# Patient Record
Sex: MALE | Race: WHITE | NOT HISPANIC OR LATINO | Employment: FULL TIME | URBAN - METROPOLITAN AREA
[De-identification: names, ages, dates, MRNs, and addresses within clinical notes are randomized per-mention and may not be internally consistent; named-entity substitution may affect disease eponyms.]

---

## 2019-02-04 ENCOUNTER — TELEPHONE (OUTPATIENT)
Dept: GASTROENTEROLOGY | Facility: MEDICAL CENTER | Age: 49
End: 2019-02-04

## 2019-02-04 NOTE — TELEPHONE ENCOUNTER
Called ptvivienne to call office   - Patient has an upcoming appt with us, however we do not have any prior records for this patient  Please try to obtain any prior GI recs before appt  Thank you

## 2019-02-05 ENCOUNTER — LAB (OUTPATIENT)
Dept: LAB | Facility: MEDICAL CENTER | Age: 49
End: 2019-02-05
Attending: INTERNAL MEDICINE
Payer: COMMERCIAL

## 2019-02-05 ENCOUNTER — OFFICE VISIT (OUTPATIENT)
Dept: GASTROENTEROLOGY | Facility: MEDICAL CENTER | Age: 49
End: 2019-02-05
Payer: COMMERCIAL

## 2019-02-05 VITALS
DIASTOLIC BLOOD PRESSURE: 68 MMHG | SYSTOLIC BLOOD PRESSURE: 122 MMHG | HEART RATE: 77 BPM | WEIGHT: 184 LBS | TEMPERATURE: 98.7 F

## 2019-02-05 DIAGNOSIS — K50.00 CROHN'S DISEASE OF SMALL INTESTINE WITHOUT COMPLICATION (HCC): Primary | ICD-10-CM

## 2019-02-05 DIAGNOSIS — R13.19 OTHER DYSPHAGIA: ICD-10-CM

## 2019-02-05 DIAGNOSIS — K50.00 CROHN'S DISEASE OF SMALL INTESTINE WITHOUT COMPLICATION (HCC): ICD-10-CM

## 2019-02-05 LAB
25(OH)D3 SERPL-MCNC: 66.4 NG/ML (ref 30–100)
ALBUMIN SERPL BCP-MCNC: 3.5 G/DL (ref 3.5–5)
ALP SERPL-CCNC: 51 U/L (ref 46–116)
ALT SERPL W P-5'-P-CCNC: 16 U/L (ref 12–78)
ANION GAP SERPL CALCULATED.3IONS-SCNC: 8 MMOL/L (ref 4–13)
AST SERPL W P-5'-P-CCNC: 7 U/L (ref 5–45)
BASOPHILS # BLD AUTO: 0.01 THOUSANDS/ΜL (ref 0–0.1)
BASOPHILS NFR BLD AUTO: 0 % (ref 0–1)
BILIRUB SERPL-MCNC: 0.44 MG/DL (ref 0.2–1)
BUN SERPL-MCNC: 15 MG/DL (ref 5–25)
CALCIUM SERPL-MCNC: 8.8 MG/DL (ref 8.3–10.1)
CHLORIDE SERPL-SCNC: 101 MMOL/L (ref 100–108)
CO2 SERPL-SCNC: 30 MMOL/L (ref 21–32)
CREAT SERPL-MCNC: 0.8 MG/DL (ref 0.6–1.3)
CRP SERPL QL: 9.7 MG/L
EOSINOPHIL # BLD AUTO: 0 THOUSAND/ΜL (ref 0–0.61)
EOSINOPHIL NFR BLD AUTO: 0 % (ref 0–6)
ERYTHROCYTE [DISTWIDTH] IN BLOOD BY AUTOMATED COUNT: 12.9 % (ref 11.6–15.1)
EST. AVERAGE GLUCOSE BLD GHB EST-MCNC: 126 MG/DL
FERRITIN SERPL-MCNC: 312 NG/ML (ref 8–388)
GFR SERPL CREATININE-BSD FRML MDRD: 106 ML/MIN/1.73SQ M
GLUCOSE SERPL-MCNC: 192 MG/DL (ref 65–140)
HBA1C MFR BLD: 6 % (ref 4.2–6.3)
HCT VFR BLD AUTO: 37.4 % (ref 36.5–49.3)
HGB BLD-MCNC: 12.9 G/DL (ref 12–17)
IMM GRANULOCYTES # BLD AUTO: 0.11 THOUSAND/UL (ref 0–0.2)
IMM GRANULOCYTES NFR BLD AUTO: 1 % (ref 0–2)
IRON SATN MFR SERPL: 28 %
IRON SERPL-MCNC: 82 UG/DL (ref 65–175)
LYMPHOCYTES # BLD AUTO: 0.75 THOUSANDS/ΜL (ref 0.6–4.47)
LYMPHOCYTES NFR BLD AUTO: 5 % (ref 14–44)
MCH RBC QN AUTO: 32.2 PG (ref 26.8–34.3)
MCHC RBC AUTO-ENTMCNC: 34.5 G/DL (ref 31.4–37.4)
MCV RBC AUTO: 93 FL (ref 82–98)
MONOCYTES # BLD AUTO: 0.54 THOUSAND/ΜL (ref 0.17–1.22)
MONOCYTES NFR BLD AUTO: 4 % (ref 4–12)
NEUTROPHILS # BLD AUTO: 12.53 THOUSANDS/ΜL (ref 1.85–7.62)
NEUTS SEG NFR BLD AUTO: 90 % (ref 43–75)
NRBC BLD AUTO-RTO: 0 /100 WBCS
PLATELET # BLD AUTO: 614 THOUSANDS/UL (ref 149–390)
PMV BLD AUTO: 8.6 FL (ref 8.9–12.7)
POTASSIUM SERPL-SCNC: 2.9 MMOL/L (ref 3.5–5.3)
PROT SERPL-MCNC: 6.5 G/DL (ref 6.4–8.2)
RBC # BLD AUTO: 4.01 MILLION/UL (ref 3.88–5.62)
SODIUM SERPL-SCNC: 139 MMOL/L (ref 136–145)
TIBC SERPL-MCNC: 294 UG/DL (ref 250–450)
VIT B12 SERPL-MCNC: 317 PG/ML (ref 100–900)
WBC # BLD AUTO: 13.94 THOUSAND/UL (ref 4.31–10.16)

## 2019-02-05 PROCEDURE — 99244 OFF/OP CNSLTJ NEW/EST MOD 40: CPT | Performed by: INTERNAL MEDICINE

## 2019-02-05 PROCEDURE — 83540 ASSAY OF IRON: CPT

## 2019-02-05 PROCEDURE — 83036 HEMOGLOBIN GLYCOSYLATED A1C: CPT

## 2019-02-05 PROCEDURE — 86140 C-REACTIVE PROTEIN: CPT

## 2019-02-05 PROCEDURE — 36415 COLL VENOUS BLD VENIPUNCTURE: CPT

## 2019-02-05 PROCEDURE — 82306 VITAMIN D 25 HYDROXY: CPT

## 2019-02-05 PROCEDURE — 83550 IRON BINDING TEST: CPT

## 2019-02-05 PROCEDURE — 82607 VITAMIN B-12: CPT

## 2019-02-05 PROCEDURE — 80053 COMPREHEN METABOLIC PANEL: CPT

## 2019-02-05 PROCEDURE — 82728 ASSAY OF FERRITIN: CPT

## 2019-02-05 PROCEDURE — 85025 COMPLETE CBC W/AUTO DIFF WBC: CPT

## 2019-02-05 RX ORDER — FOLIC ACID 1 MG/1
5000 TABLET ORAL DAILY
Refills: 5 | COMMUNITY
Start: 2018-12-28

## 2019-02-05 RX ORDER — BUSPIRONE HYDROCHLORIDE 15 MG/1
15 TABLET ORAL 3 TIMES DAILY
COMMUNITY

## 2019-02-05 RX ORDER — OMEPRAZOLE 20 MG/1
20 CAPSULE, DELAYED RELEASE ORAL DAILY
COMMUNITY
End: 2020-09-18 | Stop reason: SDUPTHER

## 2019-02-05 RX ORDER — CLONAZEPAM 1 MG/1
0.5 TABLET ORAL 2 TIMES DAILY PRN
COMMUNITY

## 2019-02-05 RX ORDER — PREDNISONE 20 MG/1
40 TABLET ORAL DAILY
Status: ON HOLD | COMMUNITY
End: 2019-03-05 | Stop reason: ALTCHOICE

## 2019-02-05 NOTE — PATIENT INSTRUCTIONS
Colononscopy and EGD scheduled on 3/5/2019 at the [facility]   Suprep sent to pharmacy/ instructions given to patient

## 2019-02-05 NOTE — PROGRESS NOTES
[facility] - Outpatient Consultation  Yan Rater 50 y o  male MRN: [id number]  Encounter: [id number]        ASSESSMENT AND PLAN:    Edwige Corley was seen today for Crohn's disease, abdominal pain, joint pain, mouth lesions and stelara  Diagnoses and all orders for this visit:    Crohn's disease of small intestine without complication  I will schedule him for a colonoscopy  Risks and benefits of the procedure were    discussed  Risks include, but are not limited to bleeding, perforation, missed lesions  He is agreeable to the procedure  Bowel prep instructions given  -     Case request operating room: COLONOSCOPY, ESOPHAGOGASTRODUODENOSCOPY (EGD); Standing  -     CBC and differential; Future  -     Comprehensive metabolic panel; Future  -     C-reactive protein; Future  -     Iron Panel; Future  -     Vitamin B12; Future  -     Vitamin D 25 hydroxy; Future  -     Na Sulfate-K Sulfate-Mg Sulf (SUPREP BOWEL PREP KIT) 17 5-3 13-1 6 GM/177ML SOLN; Take 1 Bottle by mouth once for 1 dose  -     Case request operating room: COLONOSCOPY, ESOPHAGOGASTRODUODENOSCOPY (EGD)  -continue stelara and pred 40 for now with goals of weaning off prednisone; will need dexa scan in the future due to long term steroid use    Other dysphagia  I will schedule him for an EGD  Risks and benefits of the procedure were    discussed  Risks include, but are not limited to bleeding, perforation, missed lesions  He is agreeable to the procedure  -     Case request operating room: COLONOSCOPY, ESOPHAGOGASTRODUODENOSCOPY (EGD); Standing  -     Case request operating room: COLONOSCOPY, ESOPHAGOGASTRODUODENOSCOPY (EGD)    Follow up in 1 month with me once records are in the system  _____________________________________________________________________    HPI:  Yan Rater is a 50 y o  male here for the evaluation and treatment of Crohn's disease and RLQ abdominal pain   He is currently taking Omeprazole 20 mg delayed release capsule for GERD  Arnaldo Phillips He states that the Crohn's disease is in the small intestine  It was diagnosed due to abdominal pain and undigested food and pills  He was in the ED in October 2018 due to an inflamed transverse colon  He is still experiencing blood and mucus in the stool  Patient was on Humira for a year, but it was stopped because it was not working  He was then switched to [facility], which helped more than the Humira, but could not afford it for longer than the year and a half that he was on it  He is currently taking Stelara 90mg subQ q 8 weeks  He is here on his own accord to seek a second opinion  He is also currently taking Prednisone 40mg which alleviates the pain, but he is now having trouble swallowing solids and liquids and wants to wean himself off of the prednisone  His past surgical history includes an EGD and colonoscopy 4 years ago, and was deemed asymptomatic  REVIEW OF SYSTEMS:    CONSTITUTIONAL: Denies any fever, chills, rigors, and weight loss  HEENT: No earache or tinnitus  Denies hearing loss or visual disturbances  CARDIOVASCULAR: No chest pain or palpitations  RESPIRATORY: Denies any cough, hemoptysis, shortness of breath or dyspnea on exertion  GASTROINTESTINAL: As noted in the History of Present Illness  GENITOURINARY: No problems with urination  Denies any hematuria or dysuria  NEUROLOGIC: No dizziness or vertigo, denies headaches  MUSCULOSKELETAL: Denies any muscle or joint pain  SKIN: Denies skin rashes or itching  ENDOCRINE: Denies excessive thirst  Denies intolerance to heat or cold  PSYCHOSOCIAL: Denies depression or anxiety  Denies any recent memory loss         Historical Information   Past Medical History:   Diagnosis Date    Anxiety     Cro[facility]'s disease ([facility] [address] )     Gallbladder polyp      Past Surgical History:   Procedure Laterality Date    COLONOSCOPY      LUMBAR DISCECTOMY      UMBILICAL HERNIA REPAIR      UPPER GASTROINTESTINAL ENDOSCOPY Social History   History   Alcohol Use    Yes     Comment: socially     History   Drug Use No     History   Smoking Status    Never Smoker   Smokeless Tobacco    Current User     History reviewed  No pertinent family history  Meds/Allergies       Current Outpatient Prescriptions:     busPIRone (BUSPAR) 15 mg tablet    clonazePAM (KlonoPIN) 1 mg tablet    folic acid (FOLVITE) 1 mg tablet    methotrexate (PF) intrathecal injection    omeprazole (PriLOSEC) 20 mg delayed release capsule    predniSONE 20 mg tablet    ustekinumab (STELARA) 90 mg/mL subcutaneous injection    No Known Allergies    Objective     Blood pressure 122/68, pulse 77, temperature 98 7 °F (37 1 °C), temperature source Tympanic, weight 83 5 kg (184 lb)  There is no height or weight on file to calculate BMI  PHYSICAL EXAM:      General Appearance:   Alert, cooperative, no distress   HEENT:   Normocephalic, atraumatic, anicteric      Neck:  Supple, symmetrical, trachea midline   Lungs:   Clear to auscultation bilaterally; no rales, rhonchi or wheezing; respirations unlabored    Heart[de-identified]   Regular rate and rhythm; no murmur, rub, or gallop  Abdomen:   Soft, non-tender, non-distended; normal bowel sounds; no masses, no organomegaly    Genitalia:   Deferred    Rectal:   Deferred    Extremities:  No cyanosis, clubbing or edema    Pulses:  2+ and symmetric    Skin:  No jaundice, rashes, or lesions    Lymph nodes:  No palpable cervical lymphadenopathy        Lab Results:   No visits with results within 1 Day(s) from this visit  Latest known visit with results is:   No results found for any previous visit  Radiology Results:   No results found  Attestation:   By signing my name below, IGerald, attest that this documentation has been    prepared under the direction and in the presence of SUSANNA Terry  Electronically    Signed: Rebeca Mo   2/5/19        Alison MARIN, personally performed the services described in this documentation  All medical record entries made by the scribe were at my direction and in my presence  I    have reviewed the chart and discharge instructions and agree that the record reflects my    personal performance and is accurate and complete   SUSANNA Aceves  2/5/19

## 2019-02-19 DIAGNOSIS — K50.118 CROHN'S DISEASE OF COLON WITH OTHER COMPLICATION (HCC): Primary | ICD-10-CM

## 2019-02-21 NOTE — RESULT ENCOUNTER NOTE
I tried calling Haresh x 2, but no answer and voicemail box is full  Will continue to try to call him regarding low K+ finding

## 2019-02-22 ENCOUNTER — TELEPHONE (OUTPATIENT)
Dept: GASTROENTEROLOGY | Facility: MEDICAL CENTER | Age: 49
End: 2019-02-22

## 2019-02-22 NOTE — TELEPHONE ENCOUNTER
Received a call back from patient  Reviewed critical lab value of low potassium of 2 9  Also discussed elevated CRP and mild leukocytosis  Patient reports that he has been having several weeks of diarrhea, with bowel movements 7-10 times a day  Patient states that when he saw [facility] in early February, he had had a poor appetite at that time, and feels as if his appetite has improved slightly since then  Patient reports he occasionally has sensation of heart racing  Reviewed case with Dr Ramone Rondon, Attending in office  Given low potassium finding, report of heart racing, and persistent diarrhea, concern that pt is at increased risk of dehydration and persistent electrolyte abnormalities  I advised patient to proceed immediately to the nearest ER for evaluation and repeat BMP, and patient stated that he would go to [facility] ER in [address] [place] [address]  Patient requested that we share his records with care providers at [facility] for continuity of his care  Given the urgent nature of this matter, patient's most recent lab data and GI consultation was faxed to [facility][1] ED (Fax: [phone])

## 2019-02-22 NOTE — RESULT ENCOUNTER NOTE
Attempted to reach pt by phone again today; straight to voicemail and mailbox is full  Will attempt to reach pt again today

## 2019-02-25 ENCOUNTER — TELEPHONE (OUTPATIENT)
Dept: GASTROENTEROLOGY | Facility: CLINIC | Age: 49
End: 2019-02-25

## 2019-02-25 NOTE — TELEPHONE ENCOUNTER
----- Message from Madina Carter PA-C sent at 2019  4:48 PM EST -----  Regarding: records request  Would you be able to fax a request for medical records to [facility] for [address] labs from ED visit 19? Their fax is [phone]  They need a letter on [employer] letterhead with our fax number, patient's name and , and indicating that we request lab data from 19  If this could be obtained and uploaded to our system, that would be great (since they aren't on Epic)  Thank you!     [address] [facility]

## 2019-03-04 ENCOUNTER — ANESTHESIA EVENT (OUTPATIENT)
Dept: PERIOP | Facility: HOSPITAL | Age: 49
End: 2019-03-04
Payer: COMMERCIAL

## 2019-03-05 ENCOUNTER — ANESTHESIA (OUTPATIENT)
Dept: PERIOP | Facility: HOSPITAL | Age: 49
End: 2019-03-05
Payer: COMMERCIAL

## 2019-03-05 ENCOUNTER — HOSPITAL ENCOUNTER (OUTPATIENT)
Facility: HOSPITAL | Age: 49
Setting detail: OUTPATIENT SURGERY
Discharge: HOME | End: 2019-03-05
Attending: INTERNAL MEDICINE | Admitting: INTERNAL MEDICINE
Payer: COMMERCIAL

## 2019-03-05 VITALS
HEART RATE: 86 BPM | SYSTOLIC BLOOD PRESSURE: 120 MMHG | WEIGHT: 184 LBS | RESPIRATION RATE: 18 BRPM | OXYGEN SATURATION: 92 % | TEMPERATURE: 99.2 F | HEIGHT: 74 IN | BODY MASS INDEX: 23.61 KG/M2 | DIASTOLIC BLOOD PRESSURE: 72 MMHG

## 2019-03-05 DIAGNOSIS — R13.19 OTHER DYSPHAGIA: ICD-10-CM

## 2019-03-05 DIAGNOSIS — K50.00 CROHN'S DISEASE OF SMALL INTESTINE WITHOUT COMPLICATION (HCC): ICD-10-CM

## 2019-03-05 PROCEDURE — 88305 TISSUE EXAM BY PATHOLOGIST: CPT | Performed by: PATHOLOGY

## 2019-03-05 PROCEDURE — 45380 COLONOSCOPY AND BIOPSY: CPT | Performed by: INTERNAL MEDICINE

## 2019-03-05 PROCEDURE — 43235 EGD DIAGNOSTIC BRUSH WASH: CPT | Performed by: INTERNAL MEDICINE

## 2019-03-05 RX ORDER — SODIUM CHLORIDE, SODIUM LACTATE, POTASSIUM CHLORIDE, CALCIUM CHLORIDE 600; 310; 30; 20 MG/100ML; MG/100ML; MG/100ML; MG/100ML
125 INJECTION, SOLUTION INTRAVENOUS CONTINUOUS
Status: DISCONTINUED | OUTPATIENT
Start: 2019-03-05 | End: 2019-03-05 | Stop reason: HOSPADM

## 2019-03-05 RX ORDER — FENTANYL CITRATE 50 UG/ML
INJECTION, SOLUTION INTRAMUSCULAR; INTRAVENOUS AS NEEDED
Status: DISCONTINUED | OUTPATIENT
Start: 2019-03-05 | End: 2019-03-05 | Stop reason: SURG

## 2019-03-05 RX ORDER — PROPOFOL 10 MG/ML
INJECTION, EMULSION INTRAVENOUS AS NEEDED
Status: DISCONTINUED | OUTPATIENT
Start: 2019-03-05 | End: 2019-03-05 | Stop reason: SURG

## 2019-03-05 RX ORDER — PROPOFOL 10 MG/ML
INJECTION, EMULSION INTRAVENOUS CONTINUOUS PRN
Status: DISCONTINUED | OUTPATIENT
Start: 2019-03-05 | End: 2019-03-05 | Stop reason: SURG

## 2019-03-05 RX ADMIN — FENTANYL CITRATE 50 MCG: 50 INJECTION, SOLUTION INTRAMUSCULAR; INTRAVENOUS at 11:30

## 2019-03-05 RX ADMIN — FENTANYL CITRATE 25 MCG: 50 INJECTION, SOLUTION INTRAMUSCULAR; INTRAVENOUS at 11:34

## 2019-03-05 RX ADMIN — PHENYLEPHRINE HYDROCHLORIDE 100 MCG: 10 INJECTION INTRAVENOUS at 11:48

## 2019-03-05 RX ADMIN — PHENYLEPHRINE HYDROCHLORIDE 100 MCG: 10 INJECTION INTRAVENOUS at 11:59

## 2019-03-05 RX ADMIN — SODIUM CHLORIDE, SODIUM LACTATE, POTASSIUM CHLORIDE, AND CALCIUM CHLORIDE 125 ML/HR: .6; .31; .03; .02 INJECTION, SOLUTION INTRAVENOUS at 10:48

## 2019-03-05 RX ADMIN — PHENYLEPHRINE HYDROCHLORIDE 100 MCG: 10 INJECTION INTRAVENOUS at 12:02

## 2019-03-05 RX ADMIN — PROPOFOL 180 MCG/KG/MIN: 10 INJECTION, EMULSION INTRAVENOUS at 11:30

## 2019-03-05 RX ADMIN — LIDOCAINE HYDROCHLORIDE 100 ML: 20 INJECTION, SOLUTION INTRAVENOUS at 11:30

## 2019-03-05 RX ADMIN — PROPOFOL 100 MG: 10 INJECTION, EMULSION INTRAVENOUS at 11:30

## 2019-03-05 RX ADMIN — PHENYLEPHRINE HYDROCHLORIDE 100 MCG: 10 INJECTION INTRAVENOUS at 11:54

## 2019-03-05 RX ADMIN — FENTANYL CITRATE 25 MCG: 50 INJECTION, SOLUTION INTRAMUSCULAR; INTRAVENOUS at 11:38

## 2019-03-05 NOTE — H&P
History and Physical -  [facility]  Teodoro Cronin 50 y o  male MRN: [id number]                  HPI: Teodoro Cronin is a 50y o  year old male who presents for EGD and colonoscopy  History of Crohn's disease  Just establishing care with me at most recent visit  REVIEW OF SYSTEMS: Per the HPI, and otherwise unremarkable  Historical Information   Past Medical History:   Diagnosis Date    Anxiety     Crohn's disease ([address] )     Gallbladder polyp      Past Surgical History:   Procedure Laterality Date    COLONOSCOPY      LUMBAR DISCECTOMY      UMBILICAL HERNIA REPAIR      UPPER GASTROINTESTINAL ENDOSCOPY       Social History   Social History     Substance and Sexual Activity   Alcohol Use Yes    Comment: daily 3-4 beers      Social History     Substance and Sexual Activity   Drug Use No     Social History     Tobacco Use   Smoking Status Never Smoker   Smokeless Tobacco Current User     History reviewed  No pertinent family history  Meds/Allergies     Medications Prior to Admission   Medication    busPIRone (BUSPAR) 15 mg tablet    clonazePAM (KlonoPIN) 1 mg tablet    folic acid (FOLVITE) 1 mg tablet    omeprazole (PriLOSEC) 20 mg delayed release capsule    ustekinumab (STELARA) 90 mg/mL subcutaneous injection    methotrexate (PF) intrathecal injection    Na Sulfate-K Sulfate-Mg Sulf (SUPREP BOWEL PREP KIT) 17 5-3 13-1 6 GM/177ML SOLN       No Known Allergies    Objective     Blood pressure 120/71, pulse 103, temperature 98 9 °F (37 2 °C), temperature source Tympanic, resp  rate 18, height 6' 2" (1 88 m), weight 83 5 kg (184 lb), SpO2 97 %  PHYSICAL EXAM    Gen: NAD  CV: RRR  CHEST: Clear  ABD: soft, NT/ND  EXT: no edema      ASSESSMENT/PLAN:  This is a 50y o  year old male here for EGD and colonoscopy, and he is stable and optimized for his procedure

## 2019-03-05 NOTE — OP NOTE
OPERATIVE REPORT  PATIENT NAME: Ger Gibbs    :  1970  MRN: [id number]  Pt Location: [facility] OR ROOM 03    SURGERY DATE: 3/5/2019    Surgeon(s) and Role:     * Law Dumont DO - Primary    Preop Diagnosis:  Crohn's disease of small intestine without complication ([place] [address] ) [V39 43]  Other dysphagia [R13 19]    Post-Op Diagnosis Codes:     * Crohn's disease of small intestine without complication ([place] [address] ) [S71 14]     * Other dysphagia [R13 19]    Procedure(s) (LRB):  COLONOSCOPY (N/A)  ESOPHAGOGASTRODUODENOSCOPY (EGD) (N/A)    Specimen(s):  ID Type Source Tests Collected by Time Destination   1 : bx duodenum impression:  normal Tissue Duodenum TISSUE EXAM Law Dumont, DO 3/5/2019 1138    2 : gastric bx, impression:  normal Tissue Stomach TISSUE EXAM Stephanie Griffin, DO 3/5/2019 1140    3 : bx  of ascending colon, impression: normal Tissue Large Intestine, Right/Ascending Colon TISSUE EXAM Stephanie Griffin, DO 3/5/2019 1152    4 : bx transverse colon impression: normal Tissue Large Intestine, Transverse Colon TISSUE EXAM Law Dumont, DO 3/5/2019 1154        Estimated Blood Loss:   Minimal    Drains:  * No LDAs found *    Anesthesia Type:   Choice    Operative Indications:  Crohn's disease of small intestine without complication ([place] [address] ) [A97 94]  Other dysphagia [R13 19]      Operative Findings:    ESOPHAGOGASTRODUODENOSCOPY    PROCEDURE: EGD    SEDATION: Monitored anesthesia care, check anesthesia records    ASA Class: 2    INDICATIONS: known Crohn's disease    CONSENT:  Informed consent was obtained for the procedure, including sedation after explaining the risks and benefits of the procedure  Risks including but not limited to bleeding, perforation, infection, and missed lesion  PREPARATION:   Telemetry, pulse oximetry, blood pressure were monitored throughout the procedure  Patient was identified by myself both verbally and by visual inspection of ID band      DESCRIPTION:   Patient was placed in the left lateral decubitus position and was sedated with the above medication  The gastroscope was introduced in to the oropharynx and the esophagus was intubated under direct visualization  Scope was passed down the esophagus up to 2nd part of the duodenum  A careful inspection was made as the gastroscope was withdrawn, including a retroflexed view of the stomach; findings and interventions are described below  FINDINGS:      #1  Esophagus- normal esophagus     #2  Stomach- normal stomach on direct and retroflexed views     #3  Duodenum- normal first and second part of the duodenum           IMPRESSIONS:      Normal EGD  Biopsied from stomach and duodenum  RECOMMENDATIONS:     Await biopsy results  COMPLICATIONS:  None; patient tolerated the procedure well  Colonoscopy Procedure Note    Procedure: Colonoscopy    Sedation: Monitored anesthesia care, check anesthesia records      ASA Class: 2    INDICATIONS: history of Crohn's disease     POST-OP DIAGNOSIS: See the impression below    Procedure Details     Prior colonoscopy: Less than 3 years ago  It is being repeated at an interval of less than 3 years because: This colonoscopy is being performed for a diagnostic indication    Informed consent was obtained for the procedure, including sedation  Risks of perforation, hemorrhage, adverse drug reaction and aspiration were discussed  The patient was placed in the left lateral decubitus position  Based on the pre-procedure assessment, including review of the patient's medical history, medications, allergies, and review of systems, he had been deemed to be an appropriate candidate for conscious sedation; he was therefore sedated with the medications listed below  The patient was monitored continuously with telemetry, pulse oximetry, blood pressure monitoring, and direct observations  A rectal examination was performed    The colonoscope was inserted into the rectum and advanced under direct vision to the cecum, which was identified by the ileocecal valve and appendiceal orifice  The quality of the colonic preparation was good  A careful inspection was made as the colonoscope was withdrawn, including a retroflexed view of the rectum; findings and interventions are described below  Findings:  Normal appearance of the TI  Normal appearance of the rest of the colon  Biopsies obtained from the ascending and transverse colon  Complications: None; patient tolerated the procedure well  Impression:    Normal appearance of the TI  Normal appearance of the rest of the colon  Biopsies obtained from the ascending and transverse colon  Recommendations: Follow up on biopsy results  Continue stelara  No active inflammation identified on the examinations today  COMPLICATIONS:  None; patient tolerated the procedure well      SPECIMENS:    ID Type Source Tests Collected by Time Destination   1 : bx duodenum impression:  normal Tissue Duodenum TISSUE EXAM [facility],  3/5/2019 1138    2 : gastric bx, impression:  normal Tissue Stomach TISSUE EXAM [facility], DO 3/5/2019 1140    3 : bx  of ascending colon, impression: normal Tissue Large Intestine, Right/Ascending Colon TISSUE EXAM [facility], DO 3/5/2019 1152    4 : bx transverse colon impression: normal Tissue Large Intestine, Transverse Colon TISSUE EXAM [facility], DO 3/5/2019 1154        ESTIMATED BLOOD LOSS:  Minimal      SPECIMENS:    ID Type Source Tests Collected by Time Destination   1 : bx duodenum impression:  normal Tissue Duodenum TISSUE EXAM [facility], DO 3/5/2019 1138    2 : gastric bx, impression:  normal Tissue Stomach TISSUE EXAM [facility], DO 3/5/2019 1140    3 : bx  of ascending colon, impression: normal Tissue Large Intestine, Right/Ascending Colon TISSUE EXAM [facility],  3/5/2019 1152    4 : bx transverse colon impression: normal Tissue Large Intestine, Transverse Colon TISSUE EXAM Kingsley Diamond DO 3/5/2019 1154        ESTIMATED BLOOD LOSS:  Minimal      SIGNATURE: Kingsley Diamond DO  DATE: March 5, 2019  TIME: 12:43 PM

## 2019-03-05 NOTE — ANESTHESIA PREPROCEDURE EVALUATION
Review of Systems/Medical History  Patient summary reviewed        Cardiovascular  Negative cardio ROS    Pulmonary  Negative pulmonary ROS        GI/Hepatic    Bowel prep  Comment: Chron's disease     Negative  ROS        Endo/Other  Negative endo/other ROS      GYN       Hematology  Negative hematology ROS      Musculoskeletal  Negative musculoskeletal ROS        Neurology  Negative neurology ROS      Psychology   Anxiety,              Physical Exam    Airway    Mallampati score: II  TM Distance: >3 FB  Neck ROM: full     Dental   No notable dental hx     Cardiovascular  Comment: Negative ROS,     Pulmonary      Other Findings        Anesthesia Plan  ASA Score- 2     Anesthesia Type- IV sedation with anesthesia with ASA Monitors  Additional Monitors:   Airway Plan:         Plan Factors-  Patient did not smoke on day of surgery  Induction-     Postoperative Plan-     Informed Consent- Anesthetic plan and risks discussed with patient  I personally reviewed this patient with the CRNA  Discussed and agreed on the Anesthesia Plan with the CRNA  Amparo Vogt

## 2019-03-05 NOTE — DISCHARGE INSTR - AVS FIRST PAGE
OPERATIVE REPORT  PATIENT NAME: Daniele Nichole    :  1970  MRN: [id number]  Pt Location: [facility] OR ROOM 03    SURGERY DATE: 3/5/2019    Surgeon(s) and Role:     * [facility],  - Primary    Preop Diagnosis:  Crohn's disease of small intestine without complication ([place] [address] ) [W94 90]  Other dysphagia [R13 19]    Post-Op Diagnosis Codes:     * Crohn's disease of small intestine without complication ([place] [address] ) [U49 72]     * Other dysphagia [R13 19]    Procedure(s) (LRB):  COLONOSCOPY (N/A)  ESOPHAGOGASTRODUODENOSCOPY (EGD) (N/A)    Specimen(s):  ID Type Source Tests Collected by Time Destination   1 : bx duodenum impression:  normal Tissue Duodenum TISSUE EXAM [facility], DO 3/5/2019 1138    2 : gastric bx, impression:  normal Tissue Stomach TISSUE EXAM [facility],  3/5/2019 1140    3 : bx  of ascending colon, impression: normal Tissue Large Intestine, Right/Ascending Colon TISSUE EXAM [facility], DO 3/5/2019 1152    4 : bx transverse colon impression: normal Tissue Large Intestine, Transverse Colon TISSUE EXAM [facility], DO 3/5/2019 1154        Estimated Blood Loss:   Minimal    Drains:  * No LDAs found *    Anesthesia Type:   Choice    Operative Indications:  Crohn's disease of small intestine without complication ([place] [address] ) [F07 20]  Other dysphagia [R13 19]      Operative Findings:    ESOPHAGOGASTRODUODENOSCOPY    PROCEDURE: EGD    SEDATION: Monitored anesthesia care, check anesthesia records    ASA Class: 2    INDICATIONS: known Crohn's disease    CONSENT:  Informed consent was obtained for the procedure, including sedation after explaining the risks and benefits of the procedure  Risks including but not limited to bleeding, perforation, infection, and missed lesion  PREPARATION:   Telemetry, pulse oximetry, blood pressure were monitored throughout the procedure  Patient was identified by myself both verbally and by visual inspection of ID band      DESCRIPTION:   Patient was placed in the left lateral decubitus position and was sedated with the above medication  The gastroscope was introduced in to the oropharynx and the esophagus was intubated under direct visualization  Scope was passed down the esophagus up to 2nd part of the duodenum  A careful inspection was made as the gastroscope was withdrawn, including a retroflexed view of the stomach; findings and interventions are described below  FINDINGS:      #1  Esophagus- normal esophagus     #2  Stomach- normal stomach on direct and retroflexed views     #3  Duodenum- normal first and second part of the duodenum           IMPRESSIONS:      Normal EGD  Biopsied from stomach and duodenum  RECOMMENDATIONS:     Await biopsy results  COMPLICATIONS:  None; patient tolerated the procedure well  Colonoscopy Procedure Note    Procedure: Colonoscopy    Sedation: Monitored anesthesia care, check anesthesia records      ASA Class: 2    INDICATIONS: history of Crohn's disease     POST-OP DIAGNOSIS: See the impression below    Procedure Details     Prior colonoscopy: Less than 3 years ago  It is being repeated at an interval of less than 3 years because: This colonoscopy is being performed for a diagnostic indication    Informed consent was obtained for the procedure, including sedation  Risks of perforation, hemorrhage, adverse drug reaction and aspiration were discussed  The patient was placed in the left lateral decubitus position  Based on the pre-procedure assessment, including review of the patient's medical history, medications, allergies, and review of systems, he had been deemed to be an appropriate candidate for conscious sedation; he was therefore sedated with the medications listed below  The patient was monitored continuously with telemetry, pulse oximetry, blood pressure monitoring, and direct observations  A rectal examination was performed    The colonoscope was inserted into the rectum and advanced under direct vision to the cecum, which was identified by the ileocecal valve and appendiceal orifice  The quality of the colonic preparation was good  A careful inspection was made as the colonoscope was withdrawn, including a retroflexed view of the rectum; findings and interventions are described below  Findings:  Normal appearance of the TI  Normal appearance of the rest of the colon  Biopsies obtained from the ascending and transverse colon  Complications: None; patient tolerated the procedure well  Impression:    Normal appearance of the TI  Normal appearance of the rest of the colon  Biopsies obtained from the ascending and transverse colon  Recommendations: Follow up on biopsy results  Continue stelara  No active inflammation identified on the examinations today  COMPLICATIONS:  None; patient tolerated the procedure well      SPECIMENS:    ID Type Source Tests Collected by Time Destination   1 : bx duodenum impression:  normal Tissue Duodenum TISSUE EXAM [facility], DO 3/5/2019 1138    2 : gastric bx, impression:  normal Tissue Stomach TISSUE EXAM [facility], DO 3/5/2019 1140    3 : bx  of ascending colon, impression: normal Tissue Large Intestine, Right/Ascending Colon TISSUE EXAM Stephanie [place], DO 3/5/2019 1152    4 : bx transverse colon impression: normal Tissue Large Intestine, Transverse Colon TISSUE EXAM [facility], DO 3/5/2019 1154        ESTIMATED BLOOD LOSS:  Minimal      SPECIMENS:    ID Type Source Tests Collected by Time Destination   1 : bx duodenum impression:  normal Tissue Duodenum TISSUE EXAM [facility], DO 3/5/2019 1138    2 : gastric bx, impression:  normal Tissue Stomach TISSUE EXAM [facility], DO 3/5/2019 1140    3 : bx  of ascending colon, impression: normal Tissue Large Intestine, Right/Ascending Colon TISSUE EXAM [facility], DO 3/5/2019 1152    4 : bx transverse colon impression: normal Tissue Large Intestine, Transverse Colon TISSUE EXAM Piter Munson DO 3/5/2019 1154        ESTIMATED BLOOD LOSS:  Minimal      SIGNATURE: Piter Munson DO  DATE: March 5, 2019  TIME: 12:43 PM

## 2019-03-15 ENCOUNTER — TELEPHONE (OUTPATIENT)
Dept: GASTROENTEROLOGY | Facility: MEDICAL CENTER | Age: 49
End: 2019-03-15

## 2019-03-15 NOTE — TELEPHONE ENCOUNTER
Dr Gadiel Triana patient -Hx Crohn's     Patient call for flare symptoms x 2 weeks  Generalized weakness and difficulty walking, decreased appetite, intermittent fever 100 3  Pain in back, ankles and right knee- swollen, warm to touch, 10/10  Reports 5-7 liquid BM per day with incontinence  Stelara LD- 3/8 and methotrexate LD 3/7  Please advise

## 2019-03-15 NOTE — TELEPHONE ENCOUNTER
I spoke with him - he reports he is not having GI symptoms right now but he is having severe joint pain and is unable to walk  His joints are swollen and he has an intermittent fever  I reviewed his chart, he had a normal colonoscopy 10 days ago without any sign of active disease so I suspect this could be something other than a Crohn's arthropathy but I let him know that I would discuss that with you  I asked him to call his PCP or present to the ER for further evaluation given the severity of his pain  I let him know that he should avoid NSAIDs but he could take Tylenol as directed on the bottle

## 2019-03-15 NOTE — TELEPHONE ENCOUNTER
I called him to discuss his symptoms but there was no answer so I left a message for him to call back

## 2019-03-15 NOTE — TELEPHONE ENCOUNTER
Dr Griffin's patient called wanting to speak to a nurse for adviise due to swelling, back pain, and flare up   Patient can be reached at [phone] Much better appearing, tolerated small amount of PO, will DC to home.

## 2019-03-18 ENCOUNTER — TELEPHONE (OUTPATIENT)
Dept: GASTROENTEROLOGY | Facility: AMBULARY SURGERY CENTER | Age: 49
End: 2019-03-18

## 2019-03-18 NOTE — TELEPHONE ENCOUNTER
Please see attached encounter    Patient called back for recommendation with incontinence of stool  He reports 10-12 liquid bowel movements per day with incontinence  Taking 6 Lomotil per day currently and has used Questran in the past with no decrease of diarrhea  He has follow up visit 4/16

## 2019-03-18 NOTE — TELEPHONE ENCOUNTER
Dr Lizzie Marte      He is having daily  incontinence and would like to be advised      Call back # [phone]

## 2019-03-19 DIAGNOSIS — K50.10 CROHN'S DISEASE OF COLON WITHOUT COMPLICATION (HCC): Primary | ICD-10-CM

## 2019-03-19 NOTE — TELEPHONE ENCOUNTER
Spoke to Dr Princess Toth's office about ordered labs  They are not able to do outside ordered labs at their facility and the patient is not scheduled for an appointment tomorrow  I left message on patient's voice mail to call back with information of where to sent lab slips so he can complete ordered blood work

## 2019-03-19 NOTE — TELEPHONE ENCOUNTER
Patient is aware of ordered testing and will complete tomorrow  Symptoms continue with generalized joint pain of 9/10- mostly knees and ankles, increased fatigue, chills, decreased appetite  No fever  BM less frequent today/no incontinence  He has appt with PCP tomorrow for follow up  Discussed alarm symptoms and he verbalized understanding to go to ER for evaluation for increase of symptoms

## 2019-03-19 NOTE — TELEPHONE ENCOUNTER
I am concerned for possible infection, will order stool studies, CBC, BMP, inflammatory markers  He should have a low threshold for going to the ED if he is passing frequent voluminous stools given concern of dehydration  Is he having any abdominal pain, nausea, vomiting, fever, chills  Has his joint pain improved?

## 2019-03-26 ENCOUNTER — TELEPHONE (OUTPATIENT)
Dept: GASTROENTEROLOGY | Facility: AMBULARY SURGERY CENTER | Age: 49
End: 2019-03-26

## 2019-03-26 NOTE — TELEPHONE ENCOUNTER
I spoke to patient regarding blood work and stool studies ordered  He will go to a [facility] lab, orders are in Epic

## 2019-03-26 NOTE — TELEPHONE ENCOUNTER
I spoke to patient  He is aware stool studies and bw were ordered  He will go to a [facility] lab, orders in Epic

## 2019-04-05 ENCOUNTER — HOSPITAL ENCOUNTER (OUTPATIENT)
Dept: MRI IMAGING | Facility: HOSPITAL | Age: 49
Discharge: HOME | End: 2019-04-05
Attending: INTERNAL MEDICINE

## 2019-05-06 ENCOUNTER — TELEPHONE (OUTPATIENT)
Dept: GASTROENTEROLOGY | Facility: CLINIC | Age: 49
End: 2019-05-06

## 2019-05-08 ENCOUNTER — TELEPHONE (OUTPATIENT)
Dept: GASTROENTEROLOGY | Facility: MEDICAL CENTER | Age: 49
End: 2019-05-08

## 2022-12-28 ENCOUNTER — OFFICE VISIT (OUTPATIENT)
Dept: OBGYN CLINIC | Facility: CLINIC | Age: 52
End: 2022-12-28

## 2022-12-28 ENCOUNTER — HOSPITAL ENCOUNTER (OUTPATIENT)
Dept: RADIOLOGY | Facility: CLINIC | Age: 52
Discharge: HOME | End: 2022-12-28

## 2022-12-28 VITALS — SYSTOLIC BLOOD PRESSURE: 140 MMHG | DIASTOLIC BLOOD PRESSURE: 89 MMHG | HEART RATE: 82 BPM | TEMPERATURE: 98.4 F

## 2022-12-28 DIAGNOSIS — K50.00 CROHN'S DISEASE OF SMALL INTESTINE WITHOUT COMPLICATION (HCC): ICD-10-CM

## 2022-12-28 DIAGNOSIS — M25.562 BILATERAL CHRONIC KNEE PAIN: Primary | ICD-10-CM

## 2022-12-28 DIAGNOSIS — G89.29 BILATERAL CHRONIC KNEE PAIN: ICD-10-CM

## 2022-12-28 DIAGNOSIS — M47.26 OTHER SPONDYLOSIS WITH RADICULOPATHY, LUMBAR REGION: ICD-10-CM

## 2022-12-28 DIAGNOSIS — M25.552 CHRONIC LEFT HIP PAIN: ICD-10-CM

## 2022-12-28 DIAGNOSIS — M17.0 PRIMARY OSTEOARTHRITIS OF BOTH KNEES: ICD-10-CM

## 2022-12-28 DIAGNOSIS — G89.29 CHRONIC LEFT HIP PAIN: ICD-10-CM

## 2022-12-28 DIAGNOSIS — M25.562 BILATERAL CHRONIC KNEE PAIN: ICD-10-CM

## 2022-12-28 DIAGNOSIS — G89.29 BILATERAL CHRONIC KNEE PAIN: Primary | ICD-10-CM

## 2022-12-28 DIAGNOSIS — M25.561 BILATERAL CHRONIC KNEE PAIN: Primary | ICD-10-CM

## 2022-12-28 DIAGNOSIS — M16.0 PRIMARY OSTEOARTHRITIS OF BOTH HIPS: ICD-10-CM

## 2022-12-28 DIAGNOSIS — M25.561 BILATERAL CHRONIC KNEE PAIN: ICD-10-CM

## 2022-12-28 RX ORDER — TRIAMCINOLONE ACETONIDE 40 MG/ML
40 INJECTION, SUSPENSION INTRA-ARTICULAR; INTRAMUSCULAR
Status: COMPLETED | OUTPATIENT
Start: 2022-12-28 | End: 2022-12-28

## 2022-12-28 RX ORDER — PANTOPRAZOLE SODIUM 40 MG/1
TABLET, DELAYED RELEASE ORAL DAILY
COMMUNITY
Start: 2022-11-07

## 2022-12-28 RX ORDER — DOXEPIN HYDROCHLORIDE 75 MG/1
CAPSULE ORAL DAILY
COMMUNITY
Start: 2022-12-08

## 2022-12-28 RX ORDER — BUPIVACAINE HYDROCHLORIDE 2.5 MG/ML
4 INJECTION, SOLUTION INFILTRATION; PERINEURAL
Status: COMPLETED | OUTPATIENT
Start: 2022-12-28 | End: 2022-12-28

## 2022-12-28 RX ORDER — BUPROPION HYDROCHLORIDE 150 MG/1
150 TABLET ORAL DAILY
COMMUNITY

## 2022-12-28 RX ORDER — GABAPENTIN 600 MG/1
600 TABLET ORAL 2 TIMES DAILY
COMMUNITY

## 2022-12-28 RX ORDER — CELECOXIB 200 MG/1
CAPSULE ORAL 2 TIMES DAILY
COMMUNITY
Start: 2022-12-22

## 2022-12-28 RX ORDER — VILAZODONE HYDROCHLORIDE 40 MG/1
1 TABLET ORAL DAILY
COMMUNITY
Start: 2022-12-26

## 2022-12-28 RX ORDER — DICYCLOMINE HCL 20 MG
20 TABLET ORAL 2 TIMES DAILY
COMMUNITY

## 2022-12-28 RX ADMIN — TRIAMCINOLONE ACETONIDE 40 MG: 40 INJECTION, SUSPENSION INTRA-ARTICULAR; INTRAMUSCULAR at 14:39

## 2022-12-28 RX ADMIN — BUPIVACAINE HYDROCHLORIDE 4 ML: 2.5 INJECTION, SOLUTION INFILTRATION; PERINEURAL at 14:39

## 2022-12-28 NOTE — PROGRESS NOTES
ASSESSMENT/PLAN:    Problem List Items Addressed This Visit        Digestive    Crohn's disease of small intestine without complication (HCC)    Relevant Medications    celecoxib (CeleBREX) 200 mg capsule       Nervous and Auditory    Other spondylosis with radiculopathy, lumbar region       Musculoskeletal and Integument    Primary osteoarthritis of both knees    Primary osteoarthritis of both hips       Other    Bilateral chronic knee pain - Primary    Relevant Orders    XR knee 4+ vw left injury    XR knee 4+ vw right injury    Chronic left hip pain    Relevant Orders    XR hip/pelv 2-3 vws left if performed       Plan: Treatment options were discussed  He elected to proceed with injection of both knees with a local anesthetic corticosteroid mixture  This was performed without difficulty and tolerated well  He would like to pursue injection of his left hip and will see Dr Brenda Harp for intra-articular injection of his left hip with ultrasound guidance  I will see him back in 2 or 3 weeks to assess his response to these injections  He is to contact me if questions or concerns arise in the interim  He does have a history of treatment through spine and pain management at an outside facility with a history of previous spine surgery as a young individual   Some of his symptoms are suggestive of lumbar degenerative disease with spinal stenosis  He does understand that the symptoms are not likely to respond to intra-articular injections  Return for 2 to 3 weeks with me; schedule with Dr Brenda Harp for left hip intra-articular injection  _____________________________________________________  CHIEF COMPLAINT:  Chief Complaint   Patient presents with   • Left Knee - Pain   • Right Knee - Pain   • Left Hip - Pain         SUBJECTIVE:  Jaren Romero is a 46 y o  male who presents to the office today for initial evaluation of chronic bilateral knee pain and left hip pain   The patient has a past medical history of bilateral knee osteoarthritis and left hip osteoarthritis  He has a past surgical history of a right knee medial meniscectomy performed in 2018, as well as a discectomy of the lumbar spine years ago  He currently sees a Spine and Pain doctor in the [facility], and had a rhizotomy done two months ago  The patient also notes a history of chronic bilateral shoulder pain and Crohn's disease  The patient states he has a family history of RA, though he himself was never tested  The patient has seen an orthopedic provider at [place] for many years, though a recent change in insurance caused him to look for a new doctor  The patient states that he has had many cortisone injections and aspirations for his knees, the last being doing approximately 2 years ago  The patient states that the injections provide him with about 3-4 months of relief  When asked where his knee pain is located, the patient points along the anteromedial aspect of both knees  He admits that the right knee is worse than the left, as it is a more constant pain  The patient admits to clicking and popping in both knees, as well as a feeling of instability  He denies any recent injuries, trauma, or changes in activity  The patient denies any recent swelling  The pain worsens with stairs, kneeling, and bending activities  He admits to numbness and tingling in his lower extremities, but states that this is due to his radiculopathy  In regards to his left hip, the patient states that the pain is located along the lateral thigh and does radiate into the groin  He notes stiffness with abduction and external rotation  The patient has never had any injections for his hip, and denies ever having any visco injections for his knees  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           PAST MEDICAL HISTORY:  Past Medical History:   Diagnosis Date   • Anxiety    • Crocq disease ([facility] )    • Crohn's disease ([facility] )    • Depression    • Gallbladder polyp        PAST SURGICAL HISTORY:  Past Surgical History:   Procedure Laterality Date   • COLONOSCOPY     • COLONOSCOPY N/A 03/05/2019    Procedure: COLONOSCOPY;  Surgeon: Vickey Antony DO;  Location: [facility];  Service: Gastroenterology   • ESOPHAGOGASTRODUODENOSCOPY N/A 03/05/2019    Procedure: ESOPHAGOGASTRODUODENOSCOPY (EGD); Surgeon: Vickey Antony DO;  Location: [facility];  Service: Gastroenterology   • KNEE ARTHROSCOPY Right 2018   • LUMBAR DISCECTOMY     • UMBILICAL HERNIA REPAIR     • UPPER GASTROINTESTINAL ENDOSCOPY         FAMILY HISTORY:  History reviewed  No pertinent family history      SOCIAL HISTORY:  Social History     Tobacco Use   • Smoking status: Never   • Smokeless tobacco: Current   Substance Use Topics   • Alcohol use: Yes     Comment: daily 3-4 beers    • Drug use: No       MEDICATIONS:    Current Outpatient Medications:   •  B-D ALLERGY SYRINGE 1CC/28G 28G X 1/2" 1 ML MISC, , Disp: , Rfl:   •  budesonide 9 mg TB24, 9 MG BY MOUTH EVERY IN THE MORNING, Disp: , Rfl: 2  •  buPROPion (WELLBUTRIN XL) 150 mg 24 hr tablet, Take 150 mg by mouth daily, Disp: , Rfl:   •  celecoxib (CeleBREX) 200 mg capsule, 2 (two) times a day, Disp: , Rfl:   •  clonazePAM (KlonoPIN) 1 mg tablet, Take 0 5 mg by mouth 2 (two) times a day as needed for seizures, Disp: , Rfl:   •  dicyclomine (BENTYL) 20 mg tablet, Take 20 mg by mouth 2 (two) times a day, Disp: , Rfl:   •  diphenoxylate-atropine (LOMOTIL) 2 5-0 025 mg per tablet, , Disp: , Rfl:   •  doxepin (SINEquan) 75 MG capsule, in the morning 75 mg, Disp: , Rfl:   •  folic acid (FOLVITE) 1 mg tablet, Take 5,000 mcg by mouth daily As directed, Disp: , Rfl: 5  •  gabapentin (NEURONTIN) 600 MG tablet, Take 600 mg by mouth 2 (two) times a day, Disp: , Rfl:   •  leucovorin (WELLCOVORIN) 5 mg tablet, Take 5 mg by mouth, Disp: , Rfl:   •  ustekinumab (STELARA) 90 mg/mL subcutaneous injection, Inject 90 mg under the skin once, Disp: , Rfl:   •  acetaminophen-codeine (TYLENOL #3) 300-30 mg per tablet, TAKE 1 TABLET EVERY 4-6 HOURS AS NEEDED FOR SEVERE PAIN CAUTION CAUSES SEDATION (Patient not taking: Reported on 12/28/2022), Disp: , Rfl: 0  •  al mag oxide-diphenhydramine-lidocaine viscous (MAGIC MOUTHWASH) 1:1:1 suspension, 80 ml distilled H2O, 80 ml viscous lidocaine, 80 ml mylanta, 80 ml diphenhydramine 12 5 mg, 80 ml nystatin 100,000 units,  80 ml prednisone 15 mg/5 ml,  Take 5 ml swish then spit (Patient not taking: Reported on 12/28/2022), Disp: , Rfl:   •  Alum Hydroxide-Mag Trisilicate 12-29 5 MG CHEW, 80 ml distilled H2O, 80 ml viscous lidocaine, 80 ml mylanta, 80 ml diphenhydramine 12 5 mg, 80 ml nystatin 100,000 units,  80 ml prednisone 15 mg/5 ml,  Take 5 ml swish then spit (Patient not taking: Reported on 12/28/2022), Disp: , Rfl:   •  busPIRone (BUSPAR) 10 mg tablet, , Disp: , Rfl:   •  busPIRone (BUSPAR) 15 mg tablet, Take 15 mg by mouth 3 (three) times a day (Patient not taking: Reported on 12/28/2022), Disp: , Rfl:   •  colchicine (COLCRYS) 0 6 mg tablet, Take 0 6 mg by mouth, Disp: , Rfl:   •  HYDROcodone-acetaminophen (NORCO) 5-325 mg per tablet, , Disp: , Rfl: 0  •  hydrOXYzine pamoate (VISTARIL) 25 mg capsule, Take 25 mg in the morning  Then 1-2 tabs at bedtime  As needed for insomnia or anxiety (Patient not taking: Reported on 12/28/2022), Disp: , Rfl:   •  indomethacin (INDOCIN) 50 mg capsule, TAKE 1 CAPSULE BY MOUTH 3 TIMES A DAY WITH MEALS (Patient not taking: Reported on 12/28/2022), Disp: , Rfl:   •  METHOTREXATE SODIUM IJ, Inject as directed   Prescribed by Dr Alma Cook, Disp: , Rfl:   •  naproxen (NAPROSYN) 500 mg tablet, TAKE 1 TABLET BY MOUTH TWICE A DAY AS NEEDED FOR PAIN TAKE WITH FOOD (Patient not taking: Reported on 12/28/2022), Disp: , Rfl: 0  •  nystatin-triamcinolone (MYCOLOG-II) cream, Apply topically (Patient not taking: Reported on 12/28/2022), Disp: , Rfl:   •  omeprazole (PriLOSEC) 20 mg delayed release capsule, Take 20 mg by mouth (Patient not taking: Reported on 12/28/2022), Disp: , Rfl:   •  pantoprazole (PROTONIX) 40 mg tablet, in the morning 40 mg, Disp: , Rfl:   •  sertraline (ZOLOFT) 100 mg tablet, , Disp: , Rfl:   •  traMADol (ULTRAM) 50 mg tablet, , Disp: , Rfl:   •  traZODone (DESYREL) 100 mg tablet, , Disp: , Rfl:   •  traZODone (DESYREL) 300 MG tablet, , Disp: , Rfl:   •  vilazodone (VIIBRYD) 40 mg tablet, Take 1 mg by mouth in the morning 40 mg, Disp: , Rfl:   •  zolpidem (AMBIEN) 10 mg tablet, Take 10 mg by mouth (Patient not taking: Reported on 12/28/2022), Disp: , Rfl:     ALLERGIES:  No Known Allergies    Review of systems:   Constitutional: Negative for fatigue, fever or loss of apetite  HENT: Negative  Respiratory: Negative for shortness of breath, dyspnea  Cardiovascular: Negative for chest pain/tightness  Gastrointestinal: Negative for abdominal pain, N/V  History of Crohn's Disease   Endocrine: Negative for cold/heat intolerance, unexplained weight loss/gain  Genitourinary: Negative for flank pain, dysuria, hematuria  Musculoskeletal: chronic bilateral knee pain, left hip pain, and lumbar spine pain as noted in HPI  History of gout  Skin: Negative for rash  Neurological: positive for bilateral lower extremity numbness and tingling due to radiculopathy  Psychiatric/Behavioral: Negative for agitation    _____________________________________________________  PHYSICAL EXAMINATION:    Blood pressure 140/89, pulse 82, temperature 98 4 °F (36 9 °C), temperature source Temporal     General: well developed and well nourished, alert, oriented times 3 and appears comfortable  Psychiatric: Normal  HEENT: Benign  Cardiovascular: Regular    Pulmonary: No wheezing or stridor  Abdomen: Soft, Nontender  Skin: No masses, erythema, lacerations, fluctation, ulcerations  Neurovascular: strong distal pulses, sensory and motor testing intact    MUSCULOSKELETAL EXAMINATION:    Bilateral lower extremity:  - skin without lesions, ecchymosis, erythema, swelling, warmth, or other signs of infection  - on left lower extremity there is palpable inconsistent tenderness along the medial joint line space and medial femoral condyle  No tenderness along the lateral thigh, groin, or remainder of the knee  - on the right lower extremity the patient complains of palpable and consistent tenderness along the medial and lateral joint line spaces  - active left hip ROM:    degrees with pain at endrange   ABD 35 degrees with pain in the groin   ADD 30 degrees   IR 5 degrees with pain   ER 30 degrees with pain decreased from the internal rotation position  - right hip ROM intact without complaint  -Bilateral knee ROM 0-130 degrees, medial pain at endrange of flexion bilaterally, right greater than left  - knees are stable to varus and valgus stress  - positive patellofemoral crepitation noted in the right knee  - negative anterior/posterior drawer, Lachmans  - patient able to perform a straight leg raise without complaint bilaterally  - 5/5 strength resisted knee flexion/extension, sitting hip flexion/abduction/adduction  - soft lower extremity compartments  - sensation intact to light touch distally  - lower extremities are well perfused       _____________________________________________________  STUDIES REVIEWED:  I have personally reviewed pertinent films and reports in PACS  X-rays of the left knee demonstrate mild to moderate tricompartmental osteoarthritis, worse along the medial and patellofemoral compartments  Subchondral sclerosis present, no significant osteophytes  No bony lesions  X-rays of the right knee demonstrate moderate to severe tricompartmental degenerative changes, worse along the medial joint compartment  Subchondral sclerosis and osteophytes noted   No bony lesions     X-ray of the left hip demonstrate moderate degenerative changes with joint space narrowing, osteophytes and subchondral sclerosis  The AP pelvis view does demonstrate a lesser involvement of the right hip but full hip series was not obtained  No fractures or dislocations, no bony lesions  PROCEDURES PERFORMED:  Large joint arthrocentesis: bilateral knee  Universal Protocol:  Consent: Verbal consent obtained    Consent given by: patient  Patient understanding: patient states understanding of the procedure being performed    Supporting Documentation  Indications: pain   Procedure Details  Location: knee - bilateral knee  Needle size: 22 G  Ultrasound guidance: no  Approach: lateral    Medications (Right): 4 mL bupivacaine 0 25 %; 40 mg triamcinolone acetonide 40 mg/mLMedications (Left): 4 mL bupivacaine 0 25 %; 40 mg triamcinolone acetonide 40 mg/mL         Ulis Brightly

## 2022-12-29 ENCOUNTER — TELEPHONE (OUTPATIENT)
Dept: OBGYN CLINIC | Facility: HOSPITAL | Age: 52
End: 2022-12-29

## 2022-12-29 NOTE — TELEPHONE ENCOUNTER
Caller: diane    Doctor: jaimee    Reason for call: patient upset when viewing AVS social history shows   Substance Use Topics   • Alcohol use: Yes       Comment: daily 3-4 beers    • Drug use: No     Patient states he explained to the doctor 3 Seperate times that he does not drink in over a year   Patient would like a call back to discuss and this removed from his chart please advise     Call back#: [phone]

## 2023-01-05 ENCOUNTER — OFFICE VISIT (OUTPATIENT)
Dept: OBGYN CLINIC | Facility: CLINIC | Age: 53
End: 2023-01-05

## 2023-01-05 VITALS
BODY MASS INDEX: 23.74 KG/M2 | DIASTOLIC BLOOD PRESSURE: 78 MMHG | HEIGHT: 74 IN | SYSTOLIC BLOOD PRESSURE: 130 MMHG | WEIGHT: 185 LBS | HEART RATE: 73 BPM | TEMPERATURE: 97.6 F

## 2023-01-05 DIAGNOSIS — G89.29 CHRONIC LEFT HIP PAIN: Primary | ICD-10-CM

## 2023-01-05 DIAGNOSIS — M25.552 CHRONIC LEFT HIP PAIN: Primary | ICD-10-CM

## 2023-01-05 DIAGNOSIS — M16.0 PRIMARY OSTEOARTHRITIS OF BOTH HIPS: ICD-10-CM

## 2023-01-05 RX ORDER — PREDNISONE 20 MG/1
TABLET ORAL
COMMUNITY
Start: 2023-01-03

## 2023-01-05 RX ORDER — BUPIVACAINE HYDROCHLORIDE 2.5 MG/ML
8 INJECTION, SOLUTION INFILTRATION; PERINEURAL
Status: COMPLETED | OUTPATIENT
Start: 2023-01-05 | End: 2023-01-05

## 2023-01-05 RX ORDER — TRIAMCINOLONE ACETONIDE 40 MG/ML
80 INJECTION, SUSPENSION INTRA-ARTICULAR; INTRAMUSCULAR
Status: COMPLETED | OUTPATIENT
Start: 2023-01-05 | End: 2023-01-05

## 2023-01-05 RX ORDER — PREDNISONE 1 MG/1
TABLET ORAL
COMMUNITY
Start: 2023-01-03

## 2023-01-05 RX ADMIN — BUPIVACAINE HYDROCHLORIDE 8 ML: 2.5 INJECTION, SOLUTION INFILTRATION; PERINEURAL at 14:24

## 2023-01-05 RX ADMIN — TRIAMCINOLONE ACETONIDE 80 MG: 40 INJECTION, SUSPENSION INTRA-ARTICULAR; INTRAMUSCULAR at 14:24

## 2023-01-05 NOTE — PROGRESS NOTES
Large joint arthrocentesis: L hip joint  Universal Protocol:  Consent: Verbal consent obtained  Risks and benefits: risks, benefits and alternatives were discussed  Consent given by: patient  Patient understanding: patient states understanding of the procedure being performed  Site marked: the operative site was marked  Radiology Images displayed and confirmed  If images not available, report reviewed: imaging studies available  Required items: required blood products, implants, devices, and special equipment available  Patient identity confirmed: verbally with patient    Supporting Documentation  Indications: pain   Procedure Details  Location: hip - L hip joint  Preparation: Patient was prepped and draped in the usual sterile fashion  Needle size: 20 G  Ultrasound guidance: yes  Medications administered: 8 mL bupivacaine 0 25 %; 80 mg triamcinolone acetonide 40 mg/mL    Patient tolerance: patient tolerated the procedure well with no immediate complications  Dressing:  Sterile dressing applied    Left HIP USG INTRA-ARTICULAR INJECTION:  Injection site cleaned with Betadine as well as alcohol prior to anesthetic injection using 5 cc of anesthetic without epinephrine prior to beginning ultrasound-guided  injection procedure  Injection site again cleaned with chlorhexidine prior to starting USG procedure  Sterile field, gloves, probe cover used with visualization of needle in-line with curvilinear transducer LAX to femoral neck with filling of joint capsule at head-neck junction with injectate of 8ml anesthetic plus steroid  Femoral artery and circumflex artery visualized medial to injection site on pre-scanning with doppler confirmation  No artery or nerve visualized in path of needle  Patient tolerated procedure well with minimal bleeding and no complications

## 2023-01-05 NOTE — PATIENT INSTRUCTIONS

## 2023-01-20 ENCOUNTER — OFFICE VISIT (OUTPATIENT)
Dept: OBGYN CLINIC | Facility: CLINIC | Age: 53
End: 2023-01-20

## 2023-01-20 VITALS
HEIGHT: 74 IN | DIASTOLIC BLOOD PRESSURE: 83 MMHG | SYSTOLIC BLOOD PRESSURE: 140 MMHG | OXYGEN SATURATION: 97 % | BODY MASS INDEX: 24.64 KG/M2 | WEIGHT: 192 LBS | HEART RATE: 86 BPM | TEMPERATURE: 97.8 F

## 2023-01-20 DIAGNOSIS — M16.0 PRIMARY OSTEOARTHRITIS OF BOTH HIPS: ICD-10-CM

## 2023-01-20 DIAGNOSIS — M17.0 PRIMARY OSTEOARTHRITIS OF BOTH KNEES: Primary | ICD-10-CM

## 2023-01-20 NOTE — PROGRESS NOTES
ASSESSMENT/PLAN:    Diagnoses and all orders for this visit:    Primary osteoarthritis of both knees  -     Injection procedure prior authorization; Future    Primary osteoarthritis of both hips        Plan: At this time Bryson Romero is doing well and feels his hip does not need any further attention  However, he does believe both knees bother him enough that he would prefer to try some more methods for relief  Viscosupplementation was discussed and he elected to proceed with viscosupplementation dejection  A request for authorization has been placed in his chart and I will see him once this has been approved  He is to contact me if questions or concerns arise  Return for Follow-up after viscosupplementation proved  _____________________________________________________  CHIEF COMPLAINT:  Chief Complaint   Patient presents with   • Left Knee - Follow-up   • Left Hip - Follow-up   • Right Knee - Follow-up   • Follow-up         SUBJECTIVE:  Macie Sutherland is a 46y o  year old male who presents for follow up of his bilateral knee and bilateral hip degenerative disease  He states that the injection to his knees performed by me provided about a 30% reduction in symptoms  Injection under ultrasound by Dr Kate Diaz to both hips has provided much more significant improvement  Edmond Correia PAST MEDICAL HISTORY:  Past Medical History:   Diagnosis Date   • Anxiety    • Crocq disease ([facility] [address] )    • Crohn's disease ([facility] [address] )    • Depression    • Gallbladder polyp        PAST SURGICAL HISTORY:  Past Surgical History:   Procedure Laterality Date   • COLONOSCOPY     • COLONOSCOPY N/A 03/05/2019    Procedure: COLONOSCOPY;  Surgeon: Jordan Archuleta DO;  Location: [facility];  Service: Gastroenterology   • ESOPHAGOGASTRODUODENOSCOPY N/A 03/05/2019    Procedure: ESOPHAGOGASTRODUODENOSCOPY (EGD);   Surgeon: Jordan Archuleta DO;  Location: [facility];  Service: Gastroenterology   • KNEE ARTHROSCOPY Right 2018   • LUMBAR DISCECTOMY     • UMBILICAL HERNIA REPAIR     • UPPER GASTROINTESTINAL ENDOSCOPY         FAMILY HISTORY:  History reviewed  No pertinent family history      SOCIAL HISTORY:  Social History     Tobacco Use   • Smoking status: Never   • Smokeless tobacco: Current   Substance Use Topics   • Alcohol use: Not Currently   • Drug use: No       MEDICATIONS:    Current Outpatient Medications:   •  al mag oxide-diphenhydramine-lidocaine viscous (MAGIC MOUTHWASH) 1:1:1 suspension, , Disp: , Rfl:   •  Alum Hydroxide-Mag Trisilicate 74-13 0 MG CHEW, , Disp: , Rfl:   •  B-D ALLERGY SYRINGE 1CC/28G 28G X 1/2" 1 ML MISC, , Disp: , Rfl:   •  budesonide 9 mg TB24, 9 MG BY MOUTH EVERY IN THE MORNING, Disp: , Rfl: 2  •  buPROPion (WELLBUTRIN XL) 150 mg 24 hr tablet, Take 150 mg by mouth daily, Disp: , Rfl:   •  celecoxib (CeleBREX) 200 mg capsule, 2 (two) times a day, Disp: , Rfl:   •  clonazePAM (KlonoPIN) 1 mg tablet, Take 0 5 mg by mouth 2 (two) times a day as needed for seizures, Disp: , Rfl:   •  dicyclomine (BENTYL) 20 mg tablet, Take 20 mg by mouth 2 (two) times a day, Disp: , Rfl:   •  diphenoxylate-atropine (LOMOTIL) 2 5-0 025 mg per tablet, , Disp: , Rfl:   •  doxepin (SINEquan) 75 MG capsule, in the morning 75 mg, Disp: , Rfl:   •  gabapentin (NEURONTIN) 600 MG tablet, Take 600 mg by mouth 2 (two) times a day, Disp: , Rfl:   •  pantoprazole (PROTONIX) 40 mg tablet, in the morning 40 mg, Disp: , Rfl:   •  ustekinumab (STELARA) 90 mg/mL subcutaneous injection, Inject 90 mg under the skin once, Disp: , Rfl:   •  vilazodone (VIIBRYD) 40 mg tablet, Take 1 mg by mouth in the morning 40 mg, Disp: , Rfl:   •  acetaminophen-codeine (TYLENOL #3) 300-30 mg per tablet, , Disp: , Rfl: 0  •  busPIRone (BUSPAR) 10 mg tablet, , Disp: , Rfl:   •  busPIRone (BUSPAR) 15 mg tablet, Take 15 mg by mouth 3 (three) times a day (Patient not taking: Reported on 1/20/2023), Disp: , Rfl:   •  colchicine (COLCRYS) 0 6 mg tablet, Take 0 6 mg by mouth, Disp: , Rfl:   •  folic acid (FOLVITE) 1 mg tablet, Take 5,000 mcg by mouth daily As directed (Patient not taking: Reported on 1/20/2023), Disp: , Rfl: 5  •  HYDROcodone-acetaminophen (NORCO) 5-325 mg per tablet, , Disp: , Rfl: 0  •  hydrOXYzine pamoate (VISTARIL) 25 mg capsule, , Disp: , Rfl:   •  indomethacin (INDOCIN) 50 mg capsule, , Disp: , Rfl:   •  leucovorin (WELLCOVORIN) 5 mg tablet, Take 5 mg by mouth (Patient not taking: Reported on 1/20/2023), Disp: , Rfl:   •  METHOTREXATE SODIUM IJ, Inject as directed  Prescribed by Dr Yefri Wren (Patient not taking: Reported on 1/20/2023), Disp: , Rfl:   •  naproxen (NAPROSYN) 500 mg tablet, , Disp: , Rfl: 0  •  nystatin-triamcinolone (MYCOLOG-II) cream, Apply topically (Patient not taking: Reported on 1/20/2023), Disp: , Rfl:   •  omeprazole (PriLOSEC) 20 mg delayed release capsule, Take 20 mg by mouth (Patient not taking: Reported on 1/20/2023), Disp: , Rfl:   •  predniSONE 20 mg tablet, , Disp: , Rfl:   •  predniSONE 5 mg tablet, , Disp: , Rfl:   •  sertraline (ZOLOFT) 100 mg tablet, , Disp: , Rfl:   •  traMADol (ULTRAM) 50 mg tablet, , Disp: , Rfl:   •  traZODone (DESYREL) 100 mg tablet, , Disp: , Rfl:   •  traZODone (DESYREL) 300 MG tablet, , Disp: , Rfl:   •  zolpidem (AMBIEN) 10 mg tablet, Take 10 mg by mouth (Patient not taking: Reported on 1/20/2023), Disp: , Rfl:     ALLERGIES:  No Known Allergies    REVIEW OF SYSTEMS:  Pertinent items are noted in HPI  A comprehensive review of systems was negative       _____________________________________________________  PHYSICAL EXAMINATION:  General: well developed and well nourished, alert and appears comfortable  Psychiatric: Normal  HEENT:  Normocephalic, atraumatic  Cardiovascular:  Regular  Pulmonary: No wheezing or stridor  Skin: No masses, erthema, lacerations, fluctation, ulcerations  Neurovascular: Motor and sensory exams are intact and pulses palpable      MUSCULOSKELETAL EXAMINATION:    Patient ambulated without evident difficulty and without assistive devices  Bilateral knee exam demonstrates good range of motion without significant pain  He does have some crepitus  Collateral crucial ligaments are stable  Meniscal signs are absent  There is no effusion noted  The bilateral hip exam demonstrates minimal pain with endrange flexion at 110 degrees, internal rotation to 5 degrees with minimal pain and no complaints during external rotation or abduction          Herb Rom

## 2023-01-30 DIAGNOSIS — M25.562 BILATERAL CHRONIC KNEE PAIN: ICD-10-CM

## 2023-01-30 DIAGNOSIS — G89.29 BILATERAL CHRONIC KNEE PAIN: ICD-10-CM

## 2023-01-30 DIAGNOSIS — M17.0 PRIMARY OSTEOARTHRITIS OF BOTH KNEES: Primary | ICD-10-CM

## 2023-01-30 DIAGNOSIS — M25.561 BILATERAL CHRONIC KNEE PAIN: ICD-10-CM

## 2023-02-13 ENCOUNTER — PROCEDURE VISIT (OUTPATIENT)
Dept: OBGYN CLINIC | Facility: CLINIC | Age: 53
End: 2023-02-13

## 2023-02-13 VITALS
WEIGHT: 195 LBS | TEMPERATURE: 98.1 F | HEART RATE: 92 BPM | HEIGHT: 74 IN | DIASTOLIC BLOOD PRESSURE: 85 MMHG | SYSTOLIC BLOOD PRESSURE: 120 MMHG | BODY MASS INDEX: 25.03 KG/M2

## 2023-02-13 DIAGNOSIS — M17.0 PRIMARY OSTEOARTHRITIS OF BOTH KNEES: Primary | ICD-10-CM

## 2023-02-13 RX ORDER — PRENATAL VIT 91/IRON/FOLIC/DHA 28-975-200
COMBINATION PACKAGE (EA) ORAL 2 TIMES DAILY
COMMUNITY

## 2023-02-13 NOTE — PATIENT INSTRUCTIONS
Patient will ice bilateral knee for 20 minutes 1-2 times today  Limit activity for the next 24 hours before resuming normal activity  Weightbearing as tolerated  Patient may use over-the-counter ibuprofen or Tylenol as needed for discomfort  Follow up in 3 months repeat evaluation and decision on reordering visco injection  Patient made aware that Visco injections could not be repeated for 6 months

## 2023-02-13 NOTE — PROGRESS NOTES
46 y o  male presents for Durolane injection to the bilateral knees as his first time with viscosupplement injections  He notes his right knee is his more symptomatic knee  Has had his own in the past   He is a retired physical therapist     Review of Systems  Review of systems negative unless otherwise specified in HPI    Past Medical History  Past Medical History:   Diagnosis Date   • Anxiety    • Crocq disease ([facility] [address] )    • Crohn's disease ([facility] [address] )    • Depression    • Gallbladder polyp      Past Surgical History  Past Surgical History:   Procedure Laterality Date   • COLONOSCOPY     • COLONOSCOPY N/A 03/05/2019    Procedure: COLONOSCOPY;  Surgeon: Glory Roe DO;  Location: [facility];  Service: Gastroenterology   • ESOPHAGOGASTRODUODENOSCOPY N/A 03/05/2019    Procedure: ESOPHAGOGASTRODUODENOSCOPY (EGD);   Surgeon: Glory Roe DO;  Location: [facility];  Service: Gastroenterology   • KNEE ARTHROSCOPY Right 2018   • LUMBAR DISCECTOMY     • UMBILICAL HERNIA REPAIR     • UPPER GASTROINTESTINAL ENDOSCOPY       Current Medications  Current Outpatient Medications on File Prior to Visit   Medication Sig Dispense Refill   • B-D ALLERGY SYRINGE 1CC/28G 28G X 1/2" 1 ML MISC      • budesonide 9 mg TB24 9 MG BY MOUTH EVERY IN THE MORNING  2   • buPROPion (WELLBUTRIN XL) 150 mg 24 hr tablet Take 150 mg by mouth daily     • celecoxib (CeleBREX) 200 mg capsule 2 (two) times a day     • clonazePAM (KlonoPIN) 1 mg tablet Take 0 5 mg by mouth 2 (two) times a day as needed for seizures     • doxepin (SINEquan) 75 MG capsule in the morning 75 mg     • gabapentin (NEURONTIN) 600 MG tablet Take 600 mg by mouth 2 (two) times a day     • indomethacin (INDOCIN) 50 mg capsule      • pantoprazole (PROTONIX) 40 mg tablet in the morning 40 mg     • predniSONE 20 mg tablet      • terbinafine (LamISIL) 1 % cream Apply topically 2 (two) times a day     • ustekinumab (STELARA) 90 mg/mL subcutaneous injection Inject 90 mg under the skin once     • vilazodone (VIIBRYD) 40 mg tablet Take 1 mg by mouth in the morning 40 mg     • acetaminophen-codeine (TYLENOL #3) 300-30 mg per tablet  (Patient not taking: Reported on 1/20/2023)  0   • al mag oxide-diphenhydramine-lidocaine viscous (MAGIC MOUTHWASH) 1:1:1 suspension  (Patient not taking: Reported on 2/13/2023)     • Alum Hydroxide-Mag Trisilicate 46-50 2 MG CHEW  (Patient not taking: Reported on 2/13/2023)     • busPIRone (BUSPAR) 10 mg tablet  (Patient not taking: Reported on 1/20/2023)     • busPIRone (BUSPAR) 15 mg tablet Take 15 mg by mouth 3 (three) times a day (Patient not taking: Reported on 1/20/2023)     • colchicine (COLCRYS) 0 6 mg tablet Take 0 6 mg by mouth     • dicyclomine (BENTYL) 20 mg tablet Take 20 mg by mouth 2 (two) times a day     • diphenoxylate-atropine (LOMOTIL) 2 5-0 025 mg per tablet  (Patient not taking: Reported on 1/28/6674)     • folic acid (FOLVITE) 1 mg tablet Take 5,000 mcg by mouth daily As directed (Patient not taking: Reported on 1/20/2023)  5   • HYDROcodone-acetaminophen (NORCO) 5-325 mg per tablet  (Patient not taking: Reported on 1/20/2023)  0   • hydrOXYzine pamoate (VISTARIL) 25 mg capsule  (Patient not taking: Reported on 1/20/2023)     • leucovorin (WELLCOVORIN) 5 mg tablet Take 5 mg by mouth (Patient not taking: Reported on 1/20/2023)     • METHOTREXATE SODIUM IJ      • naproxen (NAPROSYN) 500 mg tablet  (Patient not taking: Reported on 1/20/2023)  0   • nystatin-triamcinolone (MYCOLOG-II) cream Apply topically (Patient not taking: Reported on 1/20/2023)     • omeprazole (PriLOSEC) 20 mg delayed release capsule Take 20 mg by mouth (Patient not taking: Reported on 2/13/2023)     • predniSONE 5 mg tablet  (Patient not taking: Reported on 1/20/2023)     • sertraline (ZOLOFT) 100 mg tablet  (Patient not taking: Reported on 1/20/2023)     • traMADol (ULTRAM) 50 mg tablet      • traZODone (DESYREL) 100 mg tablet      • traZODone (DESYREL) 300 MG tablet      • zolpidem (AMBIEN) 10 mg tablet Take 10 mg by mouth (Patient not taking: Reported on 1/20/2023)     • [DISCONTINUED] citalopram (CeleXA) 20 mg tablet        No current facility-administered medications on file prior to visit  Recent Labs [facility])  0   Lab Value Date/Time    HCT 37 4 02/05/2019 1511    HGB 12 9 02/05/2019 1511    WBC 13 94 (H) 02/05/2019 1511    CRP 9 7 (H) 02/05/2019 1511    HGBA1C 5 6 06/11/2022 0555     Physical exam:  Body mass index is 25 04 kg/m²  · General: Awake, Alert, Oriented  · Eyes: Pupils equal, round and reactive to light  · Heart: regular rate and rhythm  · Lungs: No audible wheezing  · Abdomen: soft  bilateral Knee exam  · No gross intra-articular effusion erythema warmth or signs of infection  Able to get full extension flexion to 120 degrees  Gross ligamentous laxity  Patellofemoral rotation  Imaging  None today    Assessment:  bilateral knee arthritis    Large joint arthrocentesis: R knee  Universal Protocol:  Consent: Verbal consent obtained  Risks and benefits: risks, benefits and alternatives were discussed  Consent given by: patient  Time out: Immediately prior to procedure a "time out" was called to verify the correct patient, procedure, equipment, support staff and site/side marked as required    Timeout called at: 2/13/2023 3:17 PM   Patient understanding: patient states understanding of the procedure being performed  Site marked: the operative site was marked  Patient identity confirmed: verbally with patient    Supporting Documentation  Indications: pain   Procedure Details  Location: knee - R knee  Preparation: Patient was prepped and draped in the usual sterile fashion  Needle size: 20 G  Ultrasound guidance: no  Approach: superior  Medications administered: 3 mL sodium hyaluronate 60 MG/3ML    Patient tolerance: patient tolerated the procedure well with no immediate complications  Dressing:  Sterile dressing applied    Large joint arthrocentesis: L knee  Universal Protocol:  Consent: Verbal consent obtained  Risks and benefits: risks, benefits and alternatives were discussed  Consent given by: patient  Time out: Immediately prior to procedure a "time out" was called to verify the correct patient, procedure, equipment, support staff and site/side marked as required  Timeout called at: 2/13/2023 3:18 PM   Patient understanding: patient states understanding of the procedure being performed  Site marked: the operative site was marked  Patient identity confirmed: verbally with patient    Supporting Documentation  Indications: pain   Procedure Details  Location: knee - L knee  Preparation: Patient was prepped and draped in the usual sterile fashion  Needle size: 20 G  Ultrasound guidance: no  Approach: superior  Medications administered: 3 mL sodium hyaluronate 60 MG/3ML    Patient tolerance: patient tolerated the procedure well with no immediate complications  Dressing:  Sterile dressing applied      Plan:   Patient will ice bilateral knee for 20 minutes 1-2 times today  Limit activity for the next 24 hours before resuming normal activity  Weightbearing as tolerated  Patient may use over-the-counter ibuprofen or Tylenol as needed for discomfort  Follow up in 3 months repeat evaluation and decision on reordering visco injection  Patient made aware that Visco injections could not be repeated for 6 months  This note was created with voice recognition software

## 2024-12-02 ENCOUNTER — HOSPITAL ENCOUNTER (EMERGENCY)
Facility: HOSPITAL | Age: 54
Discharge: HOME | End: 2024-12-02
Attending: EMERGENCY MEDICINE
Payer: COMMERCIAL

## 2024-12-02 ENCOUNTER — APPOINTMENT (EMERGENCY)
Dept: CT IMAGING | Facility: HOSPITAL | Age: 54
End: 2024-12-02
Payer: COMMERCIAL

## 2024-12-02 ENCOUNTER — APPOINTMENT (EMERGENCY)
Dept: RADIOLOGY | Facility: HOSPITAL | Age: 54
End: 2024-12-02
Payer: COMMERCIAL

## 2024-12-02 VITALS
DIASTOLIC BLOOD PRESSURE: 80 MMHG | HEIGHT: 74 IN | WEIGHT: 170 LBS | SYSTOLIC BLOOD PRESSURE: 121 MMHG | RESPIRATION RATE: 24 BRPM | BODY MASS INDEX: 21.82 KG/M2 | OXYGEN SATURATION: 100 % | HEART RATE: 86 BPM | TEMPERATURE: 98.3 F

## 2024-12-02 DIAGNOSIS — R53.1 GENERALIZED WEAKNESS: ICD-10-CM

## 2024-12-02 DIAGNOSIS — R25.1 SHAKING: ICD-10-CM
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
## 2024-12-02 LAB
ALBUMIN SERPL BCG-MCNC: 4.8 G/DL (ref 3.5–5)
ALP SERPL-CCNC: 73 U/L (ref 34–104)
ALT SERPL W P-5'-P-CCNC: 9 U/L (ref 7–52)
AMMONIA PLAS-SCNC: 17 UMOL/L (ref 18–72)
ANION GAP SERPL CALCULATED.3IONS-SCNC: 8 MMOL/L (ref 4–13)
APTT PPP: 26 SECONDS (ref 23–34)
AST SERPL W P-5'-P-CCNC: 14 U/L (ref 13–39)
BASOPHILS # BLD AUTO: 0.04 THOUSANDS/ΜL (ref 0–0.1)
BASOPHILS NFR BLD AUTO: 1 % (ref 0–1)
BILIRUB SERPL-MCNC: 0.5 MG/DL (ref 0.2–1)
BUN SERPL-MCNC: 7 MG/DL (ref 5–25)
CALCIUM SERPL-MCNC: 9.6 MG/DL (ref 8.4–10.2)
CARDIAC TROPONIN I PNL SERPL HS: <2 NG/L (ref ?–50)
CHLORIDE SERPL-SCNC: 107 MMOL/L (ref 96–108)
CO2 SERPL-SCNC: 23 MMOL/L (ref 21–32)
CREAT SERPL-MCNC: 1.2 MG/DL (ref 0.6–1.3)
CRP SERPL QL: 19.7 MG/L
EOSINOPHIL # BLD AUTO: 0.12 THOUSAND/ΜL (ref 0–0.61)
EOSINOPHIL NFR BLD AUTO: 2 % (ref 0–6)
ERYTHROCYTE [DISTWIDTH] IN BLOOD BY AUTOMATED COUNT: 13.5 % (ref 11.6–15.1)
ERYTHROCYTE [SEDIMENTATION RATE] IN BLOOD: 14 MM/HOUR (ref 0–19)
FLUAV AG UPPER RESP QL IA.RAPID: NEGATIVE
FLUBV AG UPPER RESP QL IA.RAPID: NEGATIVE
GFR SERPL CREATININE-BSD FRML MDRD: 68 ML/MIN/1.73SQ M
GLUCOSE SERPL-MCNC: 109 MG/DL (ref 65–140)
HCT VFR BLD AUTO: 37.2 % (ref 36.5–49.3)
HGB BLD-MCNC: 12.4 G/DL (ref 12–17)
IMM GRANULOCYTES # BLD AUTO: 0.03 THOUSAND/UL (ref 0–0.2)
IMM GRANULOCYTES NFR BLD AUTO: 0 % (ref 0–2)
INR PPP: 1.11 (ref 0.85–1.19)
LACTATE SERPL-SCNC: 1.2 MMOL/L (ref 0.5–2)
LIPASE SERPL-CCNC: 41 U/L (ref 11–82)
LITHIUM SERPL-SCNC: 1.49 MMOL/L (ref 0.6–1.2)
LYMPHOCYTES # BLD AUTO: 1.12 THOUSANDS/ΜL (ref 0.6–4.47)
LYMPHOCYTES NFR BLD AUTO: 14 % (ref 14–44)
MAGNESIUM SERPL-MCNC: 2.2 MG/DL (ref 1.9–2.7)
MCH RBC QN AUTO: 32.7 PG (ref 26.8–34.3)
MCHC RBC AUTO-ENTMCNC: 33.3 G/DL (ref 31.4–37.4)
MCV RBC AUTO: 98 FL (ref 82–98)
MONOCYTES # BLD AUTO: 0.76 THOUSAND/ΜL (ref 0.17–1.22)
MONOCYTES NFR BLD AUTO: 9 % (ref 4–12)
NEUTROPHILS # BLD AUTO: 6 THOUSANDS/ΜL (ref 1.85–7.62)
NEUTS SEG NFR BLD AUTO: 74 % (ref 43–75)
NRBC BLD AUTO-RTO: 0 /100 WBCS
PLATELET # BLD AUTO: 328 THOUSANDS/UL (ref 149–390)
PMV BLD AUTO: 8.3 FL (ref 8.9–12.7)
POTASSIUM SERPL-SCNC: 3.7 MMOL/L (ref 3.5–5.3)
PROT SERPL-MCNC: 7.4 G/DL (ref 6.4–8.4)
PROTHROMBIN TIME: 14.7 SECONDS (ref 12.3–15)
RBC # BLD AUTO: 3.79 MILLION/UL (ref 3.88–5.62)
SARS-COV+SARS-COV-2 AG RESP QL IA.RAPID: NEGATIVE
SODIUM SERPL-SCNC: 138 MMOL/L (ref 135–147)
WBC # BLD AUTO: 8.07 THOUSAND/UL (ref 4.31–10.16)

## 2024-12-02 PROCEDURE — 99285 EMERGENCY DEPT VISIT HI MDM: CPT

## 2024-12-02 PROCEDURE — 87811 SARS-COV-2 COVID19 W/OPTIC: CPT | Performed by: EMERGENCY MEDICINE

## 2024-12-02 PROCEDURE — 83605 ASSAY OF LACTIC ACID: CPT | Performed by: EMERGENCY MEDICINE

## 2024-12-02 PROCEDURE — 85610 PROTHROMBIN TIME: CPT | Performed by: EMERGENCY MEDICINE

## 2024-12-02 PROCEDURE — 87804 INFLUENZA ASSAY W/OPTIC: CPT | Performed by: EMERGENCY MEDICINE

## 2024-12-02 PROCEDURE — 70450 CT HEAD/BRAIN W/O DYE: CPT

## 2024-12-02 PROCEDURE — 85730 THROMBOPLASTIN TIME PARTIAL: CPT | Performed by: EMERGENCY MEDICINE

## 2024-12-02 PROCEDURE — 80178 ASSAY OF LITHIUM: CPT | Performed by: EMERGENCY MEDICINE

## 2024-12-02 PROCEDURE — 85025 COMPLETE CBC W/AUTO DIFF WBC: CPT | Performed by: EMERGENCY MEDICINE

## 2024-12-02 PROCEDURE — 36415 COLL VENOUS BLD VENIPUNCTURE: CPT | Performed by: EMERGENCY MEDICINE

## 2024-12-02 PROCEDURE — 83690 ASSAY OF LIPASE: CPT | Performed by: EMERGENCY MEDICINE

## 2024-12-02 PROCEDURE — 84484 ASSAY OF TROPONIN QUANT: CPT | Performed by: EMERGENCY MEDICINE

## 2024-12-02 PROCEDURE — 83735 ASSAY OF MAGNESIUM: CPT | Performed by: EMERGENCY MEDICINE

## 2024-12-02 PROCEDURE — 82140 ASSAY OF AMMONIA: CPT | Performed by: EMERGENCY MEDICINE

## 2024-12-02 PROCEDURE — 93005 ELECTROCARDIOGRAM TRACING: CPT

## 2024-12-02 PROCEDURE — 80053 COMPREHEN METABOLIC PANEL: CPT | Performed by: EMERGENCY MEDICINE

## 2024-12-02 PROCEDURE — 85652 RBC SED RATE AUTOMATED: CPT | Performed by: EMERGENCY MEDICINE

## 2024-12-02 PROCEDURE — 86140 C-REACTIVE PROTEIN: CPT | Performed by: EMERGENCY MEDICINE

## 2024-12-02 PROCEDURE — 99285 EMERGENCY DEPT VISIT HI MDM: CPT | Performed by: EMERGENCY MEDICINE

## 2024-12-02 PROCEDURE — 96360 HYDRATION IV INFUSION INIT: CPT

## 2024-12-02 PROCEDURE — 71046 X-RAY EXAM CHEST 2 VIEWS: CPT

## 2024-12-02 RX ADMIN — SODIUM CHLORIDE 1000 ML: 0.9 INJECTION, SOLUTION INTRAVENOUS at 21:43

## 2024-12-03 LAB
ATRIAL RATE: 84 BPM
P AXIS: 50 DEGREES
PR INTERVAL: 184 MS
QRS AXIS: 33 DEGREES
QRSD INTERVAL: 114 MS
QT INTERVAL: 398 MS
QTC INTERVAL: 470 MS
T WAVE AXIS: 60 DEGREES
VENTRICULAR RATE: 84 BPM

## 2024-12-03 PROCEDURE — 93010 ELECTROCARDIOGRAM REPORT: CPT | Performed by: INTERNAL MEDICINE
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
## (undated) DEVICE — CONMED SCOPE SAVER BITE BLOCK, 20X27 MM: Brand: SCOPE SAVER

## (undated) DEVICE — 2000CC GUARDIAN II: Brand: GUARDIAN

## (undated) DEVICE — TUBING SUCTION 5MM X 12 FT

## (undated) DEVICE — LUBRICANT SURGILUBE TUBE 4 OZ  FLIP TOP